# Patient Record
Sex: MALE | Race: WHITE | NOT HISPANIC OR LATINO | Employment: FULL TIME | URBAN - METROPOLITAN AREA
[De-identification: names, ages, dates, MRNs, and addresses within clinical notes are randomized per-mention and may not be internally consistent; named-entity substitution may affect disease eponyms.]

---

## 2022-07-25 ENCOUNTER — OFFICE VISIT (OUTPATIENT)
Dept: INTERNAL MEDICINE | Age: 47
End: 2022-07-25

## 2022-07-25 VITALS
OXYGEN SATURATION: 98 % | DIASTOLIC BLOOD PRESSURE: 80 MMHG | BODY MASS INDEX: 22.29 KG/M2 | HEART RATE: 90 BPM | TEMPERATURE: 97.8 F | HEIGHT: 76 IN | WEIGHT: 183 LBS | SYSTOLIC BLOOD PRESSURE: 122 MMHG

## 2022-07-25 DIAGNOSIS — F17.210 CIGARETTE NICOTINE DEPENDENCE WITHOUT COMPLICATION: ICD-10-CM

## 2022-07-25 DIAGNOSIS — Z76.89 ENCOUNTER TO ESTABLISH CARE: Primary | ICD-10-CM

## 2022-07-25 DIAGNOSIS — Z12.83 SCREENING EXAM FOR SKIN CANCER: ICD-10-CM

## 2022-07-25 DIAGNOSIS — Z11.59 ENCOUNTER FOR HEPATITIS C SCREENING TEST FOR LOW RISK PATIENT: ICD-10-CM

## 2022-07-25 DIAGNOSIS — I87.2 VENOUS INSUFFICIENCY OF RIGHT LOWER EXTREMITY: ICD-10-CM

## 2022-07-25 DIAGNOSIS — Z12.11 COLON CANCER SCREENING: ICD-10-CM

## 2022-07-25 PROCEDURE — 99214 OFFICE O/P EST MOD 30 MIN: CPT

## 2022-07-25 NOTE — PROGRESS NOTES
"    I N T E R N A L  M E D I C I N E  Neeru Fletcher, KARTHIK    ENCOUNTER DATE:  07/25/2022    Barrett Xiong / 46 y.o. / male      CHIEF COMPLAINT / REASON FOR OFFICE VISIT     Establish Care and Leg Problem (Right )      ASSESSMENT & PLAN     Diagnoses and all orders for this visit:    1. Encounter to establish care (Primary)  -     CBC & Differential; Future  -     Comprehensive Metabolic Panel; Future  -     Hemoglobin A1c; Future  -     Lipid Panel With / Chol / HDL Ratio; Future  -     TSH+Free T4; Future  -     Urinalysis without microscopic (no culture) - Urine, Clean Catch; Future    2. Venous insufficiency of right lower extremity  -     Ambulatory Referral to Vascular Surgery    3. Encounter for hepatitis C screening test for low risk patient  -     Hepatitis C Antibody; Future    4. Colon cancer screening  -     Ambulatory Referral For Screening Colonoscopy    5. Screening exam for skin cancer  -     Ambulatory Referral to Dermatology    6. Cigarette nicotine dependence without complication         SUMMARY/DISCUSSION  • Pt educated on signs of DVT for which he would need to seek immediate care at ED: swelling, pain, erythema, cramping pain in calf.    • Agreeable to update labs and annual physical in 1 week.  Will consider getting COVID vaccine and PNA vaccine at follow up.  • Not interested in quitting smoking at this moment, but is aware of health implications.  Agreeable to discussing further at follow up appointment.        Next Appointment with me: Visit date not found    Return in about 1 week (around 8/1/2022) for Annual physical + lab appointment for fasting labs.      VITAL SIGNS     Visit Vitals  /80   Pulse 90   Temp 97.8 °F (36.6 °C) (Temporal)   Ht 193 cm (76\")   Wt 83 kg (183 lb)   SpO2 98%   BMI 22.28 kg/m²           @BP@  Wt Readings from Last 3 Encounters:   07/25/22 83 kg (183 lb)   07/17/22 83.9 kg (185 lb)   09/04/21 85.7 kg (189 lb)     Body mass index is 22.28 " "kg/m².        MEDICATIONS AT THE TIME OF OFFICE VISIT     No current outpatient medications on file prior to visit.     No current facility-administered medications on file prior to visit.        HISTORY OF PRESENT ILLNESS     Here to establish care.  He complains of ongoing sensation of \"pressure and tightness\" in anterior R leg over the last 3 weeks.  He denies pain, numbness, tingling, erythema, swelling.  He works for "[employer] and is very physical active and drives a lot.  He notes his symptoms are made worse by lengthy driving, and tight shoes.  He was seen at an urgent care on July 17, and diagnosed with venous insufficiency.  He is a pack per day smoker.  Pt recently started wearing a compression sock on RLE and reports improvement in symptoms.      Patient Care Team:  Neeru Bynum APRN as PCP - General (Family Medicine)    REVIEW OF SYSTEMS     Review of Systems   Constitutional: Negative for chills, fatigue and fever.   Eyes: Negative for visual disturbance.   Respiratory: Negative for cough, chest tightness, shortness of breath and wheezing.    Cardiovascular: Negative for chest pain, palpitations and leg swelling.   Gastrointestinal: Negative for abdominal pain and blood in stool.   Musculoskeletal: Negative for back pain and gait problem.   Neurological: Negative for dizziness, weakness, light-headedness, numbness and headaches.          PHYSICAL EXAMINATION     Physical Exam  Vitals reviewed.   Constitutional:       General: He is not in acute distress.     Appearance: Normal appearance. He is not ill-appearing, toxic-appearing or diaphoretic.   HENT:      Head: Normocephalic and atraumatic.   Cardiovascular:      Rate and Rhythm: Normal rate and regular rhythm.      Pulses: Normal pulses.      Heart sounds: Normal heart sounds.   Pulmonary:      Effort: Pulmonary effort is normal.      Breath sounds: Normal breath sounds.   Musculoskeletal:         General: No swelling or deformity.   Skin:     " General: Skin is warm and dry.      Comments: varicose veins noted on anterior RLE, not hard and nontender   Neurological:      Mental Status: He is alert and oriented to person, place, and time. Mental status is at baseline.   Psychiatric:         Mood and Affect: Mood normal.         Behavior: Behavior normal.         Thought Content: Thought content normal.         Judgment: Judgment normal.           REVIEWED DATA     Labs:           Imaging:            Medical Tests:           Summary of old records / correspondence / consultant report:           Request outside records:

## 2022-07-27 ENCOUNTER — PATIENT ROUNDING (BHMG ONLY) (OUTPATIENT)
Dept: INTERNAL MEDICINE | Age: 47
End: 2022-07-27

## 2022-07-27 NOTE — PROGRESS NOTES
A My-Chart message has been sent to the patient for PATIENT ROUNDING with [facility]

## 2022-08-01 ENCOUNTER — OFFICE VISIT (OUTPATIENT)
Dept: INTERNAL MEDICINE | Age: 47
End: 2022-08-01

## 2022-08-01 VITALS
SYSTOLIC BLOOD PRESSURE: 130 MMHG | HEIGHT: 76 IN | HEART RATE: 81 BPM | OXYGEN SATURATION: 98 % | WEIGHT: 184 LBS | BODY MASS INDEX: 22.41 KG/M2 | TEMPERATURE: 96.9 F | DIASTOLIC BLOOD PRESSURE: 82 MMHG

## 2022-08-01 DIAGNOSIS — Z00.00 ANNUAL PHYSICAL EXAM: Primary | ICD-10-CM

## 2022-08-01 DIAGNOSIS — R97.20 ELEVATED PSA: ICD-10-CM

## 2022-08-01 DIAGNOSIS — F17.210 CIGARETTE NICOTINE DEPENDENCE WITHOUT COMPLICATION: ICD-10-CM

## 2022-08-01 DIAGNOSIS — Z12.5 PROSTATE CANCER SCREENING: ICD-10-CM

## 2022-08-01 DIAGNOSIS — F52.4 PREMATURE EJACULATION: ICD-10-CM

## 2022-08-01 PROCEDURE — 99396 PREV VISIT EST AGE 40-64: CPT

## 2022-08-01 NOTE — PROGRESS NOTES
"    I N T E R N A L  M E D I C I N E  KARTHIK Mccormick      ENCOUNTER DATE:  08/01/2022    Barrett Xiong / 46 y.o. / male    CHIEF COMPLAINT     Annual Exam    Is scheduled to follow up with Dermatology on September 12.    Has yet to schedule colonoscopy and with vascular for venous insufficiency of RLE, but does plan to schedule both.      VITALS     Visit Vitals  /82   Pulse 81   Temp 96.9 °F (36.1 °C) (Temporal)   Ht 193 cm (75.98\")   Wt 83.5 kg (184 lb)   SpO2 98%   BMI 22.41 kg/m²       BP Readings from Last 3 Encounters:   08/01/22 130/82   07/25/22 122/80   07/17/22 176/76     Wt Readings from Last 3 Encounters:   08/01/22 83.5 kg (184 lb)   07/25/22 83 kg (183 lb)   07/17/22 83.9 kg (185 lb)      Body mass index is 22.41 kg/m².      MEDICATIONS     No current outpatient medications on file prior to visit.     No current facility-administered medications on file prior to visit.         HISTORY OF PRESENT ILLNESS      Barrett presents for annual health maintenance visit.    · General health: good  · Lifestyle:  · Attempting to lose weight?: No   · Diet: does not pay attention to diet, does try to incorporate vegetables, small amounts of fruit  · Exercise: very active at work/home  · Tobacco: Regular use (~1 pack/day)   · Alcohol: occasional/infrequent  · Work: Full-time  · Reproductive health:  · Sexually active?: Yes   · Concern for STD?: No   · Sexual problems?: diminished libido and premature ejaculation   · Sees Urologist?: No   · Depression Screening:      PHQ-2/PHQ-9 Depression Screening 7/25/2022   Little Interest or Pleasure in Doing Things 0-->not at all   Feeling Down, Depressed or Hopeless 0-->not at all   PHQ-9: Brief Depression Severity Measure Score 0         PHQ-2: 0 (Not depressed)     PHQ-9: 0 (Negative screening for depression)    Patient Care Team:  Neeru Bynum APRN as PCP - General (Family " Medicine)  ______________________________________________________________________    ALLERGIES  No Known Allergies     PFSH:     The following portions of the patient's history were reviewed and updated as appropriate: Allergies / Current Medications / Past Medical History / Surgical History / Social History / Family History    PROBLEM LIST   Patient Active Problem List   Diagnosis   • Venous insufficiency of right lower extremity   • Cigarette nicotine dependence without complication       PAST MEDICAL HISTORY  No past medical history on file.    SURGICAL HISTORY  No past surgical history on file.    SOCIAL HISTORY  Social History     Socioeconomic History   • Marital status:    Tobacco Use   • Smoking status: Current Every Day Smoker     Packs/day: 1.00     Types: Cigarettes   • Smokeless tobacco: Never Used   Vaping Use   • Vaping Use: Never used   Substance and Sexual Activity   • Alcohol use: Yes     Comment: socially   • Drug use: Never   • Sexual activity: Yes     Partners: Female       FAMILY HISTORY  Family History   Problem Relation Age of Onset   • Cancer Mother         Bowel   • Cancer Father         lung & throat   • No Known Problems Sister        IMMUNIZATION HISTORY    There is no immunization history on file for this patient.      REVIEW OF SYSTEMS     Review of Systems   Constitutional: Negative for chills, fever and unexpected weight change.   Respiratory: Negative for cough, chest tightness and shortness of breath.    Cardiovascular: Negative for chest pain, palpitations and leg swelling.   Gastrointestinal: Negative for abdominal pain and blood in stool.   Genitourinary: Negative for difficulty urinating.   Neurological: Negative for dizziness, weakness, light-headedness and headaches.   Psychiatric/Behavioral: The patient is not nervous/anxious.        PHYSICAL EXAMINATION     Physical Exam  Vitals reviewed.   Constitutional:       General: He is not in acute distress.     Appearance:  Normal appearance. He is not ill-appearing, toxic-appearing or diaphoretic.   HENT:      Head: Normocephalic and atraumatic.      Right Ear: Tympanic membrane, ear canal and external ear normal. There is no impacted cerumen.      Left Ear: Tympanic membrane, ear canal and external ear normal. There is no impacted cerumen.      Nose: Nose normal. No congestion or rhinorrhea.      Mouth/Throat:      Mouth: Mucous membranes are moist.      Pharynx: Oropharynx is clear. No oropharyngeal exudate or posterior oropharyngeal erythema.   Eyes:      Extraocular Movements: Extraocular movements intact.      Conjunctiva/sclera: Conjunctivae normal.      Pupils: Pupils are equal, round, and reactive to light.   Cardiovascular:      Rate and Rhythm: Normal rate and regular rhythm.      Heart sounds: Normal heart sounds.   Pulmonary:      Effort: Pulmonary effort is normal. No respiratory distress.      Breath sounds: Normal breath sounds.   Abdominal:      General: Bowel sounds are normal.      Palpations: Abdomen is soft.      Tenderness: There is no abdominal tenderness.   Musculoskeletal:         General: Normal range of motion.      Cervical back: Normal range of motion and neck supple.      Right lower leg: No edema.      Left lower leg: No edema.   Lymphadenopathy:      Cervical: No cervical adenopathy.   Skin:     General: Skin is warm and dry.   Neurological:      General: No focal deficit present.      Mental Status: He is alert and oriented to person, place, and time. Mental status is at baseline.   Psychiatric:         Mood and Affect: Mood normal.         Behavior: Behavior normal.         Thought Content: Thought content normal.         Judgment: Judgment normal.         REVIEWED DATA      Labs:    No results found for: NA, K, CALCIUM, AST, ALT, BUN, CREATININE, EGFRIFNONA, EGFRIFAFRI    No results found for: GLUCOSE, HGBA1C, TSH, FREET4    No results found for: PSA    [unfilled]    No results found for: LDL, HDL, TRIG,  CHOLHDLRATIO    No components found for: RBFH010V    No results found for: WBC, HGB, MCV, PLT    No results found for: PROTEIN, GLUCOSEU, BLOODU, NITRITEU, LEUKOCYTESUR     No results found for: HEPCVIRUSABY    Imaging:           Medical Tests:           ASSESSMENT & PLAN     ANNUAL WELLNESS EXAM / PHYSICAL     Diagnoses and all orders for this visit:    1. Annual physical exam (Primary)    2. Premature ejaculation  -     Ambulatory Referral to Urology    3. Prostate cancer screening  -     PSA Screen    4. Cigarette nicotine dependence without complication         Summary/Discussion:     · Pt to follow up to schedule appointment with vascular surgery.  · He is agreeable to work towards decreasing daily cigarette use.  He is aware of health implications of continued smoking.    · Referral to urology for lifelong concern of premature ejaculation.    · He believes he received TDAP approximately 3 years ago.  He will verify records.    Next Appointment with me: Visit date not found    Return in about 6 months (around 2/1/2023) for Recheck, then yearly annual.      HEALTHCARE MAINTENANCE ISSUES       Cancer Screening:  · Colon: Initial/Next screening at age: DUE NOW  · Repeat colon cancer screening: N/A at this time  · Prostate: PSA ordered, defer MARY  · Testicular: Recommended monthly self exam  · Skin: Monthly self skin examination, annual exam by health professional  · Lung: Does not meet criteria for lung cancer screening.   · Other:    Screening Labs & Tests:  · Lab results reviewed & discussed with with patient or orders placed today.  · EKG:  · CV Screening: Lipid panel  · DEXA (75+ or risk factors):   · HEP C (If born [id number] or risk factors): Ordered  · Other:     Immunization/Vaccinations (to be given today unless deferred by patient)  · Influenza: Recommended annual influenza vaccine  · Hepatitis A: Verify immunization records  · Hepatitis B: Verify immunization records  · Tetanus/Pertussis: Verify  immunization records - believes this was done 3 years ago at [facility]  · Pneumovax/PCV: Declined by patient  · Shingles: Not needed at this time  · COVID: Advised to take the vaccine   Lifestyle Counseling:  · Lifestyle Modifications: Quit smoking  · Safety Issues: Always wear seatbelt, Avoid texting while driving   · Use sunscreen, regular skin examination  · Recommended annual dental/vision examination.  · Emotional/Stress/Sleep: Reviewed and  given when appropriate      Health Maintenance   Topic Date Due   • COLORECTAL CANCER SCREENING  Never done   • TDAP/TD VACCINES (1 - Tdap) Never done   • HEPATITIS C SCREENING  Never done   • COVID-19 Vaccine (1) 08/03/2022 (Originally 6/30/1976)   • Pneumococcal Vaccine 0-64 (1 - PCV) 08/01/2023 (Originally 12/31/1981)   • INFLUENZA VACCINE  10/01/2022   • ANNUAL PHYSICAL  08/02/2023

## 2022-08-02 PROBLEM — R97.20 PSA ELEVATION: Status: ACTIVE | Noted: 2022-08-02

## 2022-08-02 LAB — PSA SERPL-MCNC: 5 NG/ML (ref 0–4)

## 2022-08-18 ENCOUNTER — PRE-PROCEDURE SCREENING (OUTPATIENT)
Dept: GASTROENTEROLOGY | Facility: CLINIC | Age: 47
End: 2022-08-18

## 2022-08-30 ENCOUNTER — HOSPITAL ENCOUNTER (OUTPATIENT)
Dept: CARDIOLOGY | Facility: HOSPITAL | Age: 47
Discharge: HOME | End: 2022-08-30

## 2022-08-30 ENCOUNTER — OFFICE VISIT (OUTPATIENT)
Dept: INTERNAL MEDICINE | Age: 47
End: 2022-08-30

## 2022-08-30 VITALS
TEMPERATURE: 96.4 F | HEART RATE: 87 BPM | BODY MASS INDEX: 22.29 KG/M2 | SYSTOLIC BLOOD PRESSURE: 120 MMHG | WEIGHT: 183 LBS | OXYGEN SATURATION: 99 % | DIASTOLIC BLOOD PRESSURE: 80 MMHG | HEIGHT: 76 IN

## 2022-08-30 DIAGNOSIS — M79.604 RIGHT LEG PAIN: Primary | ICD-10-CM

## 2022-08-30 DIAGNOSIS — M79.671 RIGHT FOOT PAIN: ICD-10-CM

## 2022-08-30 LAB
BH CV LOWER VASCULAR LEFT COMMON FEMORAL AUGMENT: NORMAL
BH CV LOWER VASCULAR LEFT COMMON FEMORAL COMPETENT: NORMAL
BH CV LOWER VASCULAR LEFT COMMON FEMORAL COMPRESS: NORMAL
BH CV LOWER VASCULAR LEFT COMMON FEMORAL PHASIC: NORMAL
BH CV LOWER VASCULAR LEFT COMMON FEMORAL SPONT: NORMAL
BH CV LOWER VASCULAR RIGHT COMMON FEMORAL AUGMENT: NORMAL
BH CV LOWER VASCULAR RIGHT COMMON FEMORAL COMPETENT: NORMAL
BH CV LOWER VASCULAR RIGHT COMMON FEMORAL COMPRESS: NORMAL
BH CV LOWER VASCULAR RIGHT COMMON FEMORAL PHASIC: NORMAL
BH CV LOWER VASCULAR RIGHT COMMON FEMORAL SPONT: NORMAL
BH CV LOWER VASCULAR RIGHT DISTAL FEMORAL COMPRESS: NORMAL
BH CV LOWER VASCULAR RIGHT GASTRONEMIUS COMPRESS: NORMAL
BH CV LOWER VASCULAR RIGHT GREATER SAPH AK COMPRESS: NORMAL
BH CV LOWER VASCULAR RIGHT GREATER SAPH BK COMPRESS: NORMAL
BH CV LOWER VASCULAR RIGHT LESSER SAPH COMPRESS: NORMAL
BH CV LOWER VASCULAR RIGHT MID FEMORAL AUGMENT: NORMAL
BH CV LOWER VASCULAR RIGHT MID FEMORAL COMPETENT: NORMAL
BH CV LOWER VASCULAR RIGHT MID FEMORAL COMPRESS: NORMAL
BH CV LOWER VASCULAR RIGHT MID FEMORAL PHASIC: NORMAL
BH CV LOWER VASCULAR RIGHT MID FEMORAL SPONT: NORMAL
BH CV LOWER VASCULAR RIGHT PERONEAL COMPRESS: NORMAL
BH CV LOWER VASCULAR RIGHT POPLITEAL AUGMENT: NORMAL
BH CV LOWER VASCULAR RIGHT POPLITEAL COMPETENT: NORMAL
BH CV LOWER VASCULAR RIGHT POPLITEAL COMPRESS: NORMAL
BH CV LOWER VASCULAR RIGHT POPLITEAL PHASIC: NORMAL
BH CV LOWER VASCULAR RIGHT POPLITEAL SPONT: NORMAL
BH CV LOWER VASCULAR RIGHT POSTERIOR TIBIAL COMPRESS: NORMAL
BH CV LOWER VASCULAR RIGHT PROFUNDA FEMORAL COMPRESS: NORMAL
BH CV LOWER VASCULAR RIGHT PROXIMAL FEMORAL COMPRESS: NORMAL
BH CV LOWER VASCULAR RIGHT SAPHENOFEMORAL JUNCTION COMPRESS: NORMAL
BH CV LOWER VASCULAR RIGHT VARICOSITY BK COMPRESS: NORMAL
MAXIMAL PREDICTED HEART RATE: 175 BPM
STRESS TARGET HR: 149 BPM

## 2022-08-30 PROCEDURE — 93971 EXTREMITY STUDY: CPT

## 2022-08-30 PROCEDURE — 99214 OFFICE O/P EST MOD 30 MIN: CPT

## 2022-08-30 NOTE — PROGRESS NOTES
"    I N T E R N A L  M E D I C I N E  Neeru Bynum, APRN    ENCOUNTER DATE:  08/30/2022    Barrett Xiong / 46 y.o. / male      CHIEF COMPLAINT / REASON FOR OFFICE VISIT     Leg Pain      ASSESSMENT & PLAN     Diagnoses and all orders for this visit:    1. Right leg pain (Primary)  -     Duplex Venous Lower Extremity - Right CAR  -     XR Foot 3+ View Right    2. Right foot pain  -     Ambulatory Referral to Podiatry         SUMMARY/DISCUSSION  • Will rule out DVT due to pt's history of venous insufficiency, smoking, and reports of increased RLE pain and tightness upon waking this morning.  • Pt educated on importance of smoking cessation.  • Follow up with vascular as scheduled.  • Suspect possible Valentine's neuroma.  Foot XR and referral to podiatry placed.   • Pt aware of importance of following up with urology for history of elevated PSA.  He states he plans on scheduling.      Next Appointment with me: 2/1/2023    Return for Next scheduled follow up.      VITAL SIGNS     Visit Vitals  /80   Pulse 87   Temp 96.4 °F (35.8 °C) (Temporal)   Ht 193 cm (75.98\")   Wt 83 kg (183 lb)   SpO2 99%   BMI 22.28 kg/m²             Wt Readings from Last 3 Encounters:   08/30/22 83 kg (183 lb)   08/01/22 83.5 kg (184 lb)   07/25/22 83 kg (183 lb)     Body mass index is 22.28 kg/m².        MEDICATIONS AT THE TIME OF OFFICE VISIT     No current outpatient medications on file prior to visit.     No current facility-administered medications on file prior to visit.        HISTORY OF PRESENT ILLNESS     Reports he has appointment with vascular approximately 2 weeks ago.  They scheduled US to evaluate venous insufficiency, but it is not scheduled until January.  He has increased his compression sock from 10 mmHg to 20 mmHg of pressure with some benefit.    This morning he woke up with RLE pain and sensation of tightness in R leg and foot.  Denies any swelling, erythema.  Does report pain with walking on R ball of foot, sometimes " feels as though he is stepping on a pebble.  He is very physically active for work.  Does report increased physical activity over the last couple weeks.      Patient Care Team:  Neeru Bynum APRN as PCP - General (Family Medicine)    REVIEW OF SYSTEMS     Review of Systems   Constitutional: Negative for chills, fever and unexpected weight change.   Respiratory: Negative for cough, chest tightness and shortness of breath.    Cardiovascular: Negative for chest pain, palpitations and leg swelling.   Neurological: Negative for dizziness, weakness, light-headedness and headaches.   Psychiatric/Behavioral: The patient is not nervous/anxious.           PHYSICAL EXAMINATION     Physical Exam  Vitals reviewed.   Constitutional:       General: He is not in acute distress.     Appearance: Normal appearance. He is not ill-appearing, toxic-appearing or diaphoretic.   HENT:      Head: Normocephalic and atraumatic.   Cardiovascular:      Rate and Rhythm: Normal rate and regular rhythm.      Pulses: Normal pulses.      Heart sounds: Normal heart sounds.   Pulmonary:      Effort: Pulmonary effort is normal.      Breath sounds: Normal breath sounds.   Musculoskeletal:         General: No swelling.      Right lower leg: No edema.      Left lower leg: No edema.   Skin:     General: Skin is warm and dry.      Coloration: Skin is pale (R foot).      Findings: No erythema.      Comments: Cool R foot   Neurological:      Mental Status: He is alert and oriented to person, place, and time. Mental status is at baseline.   Psychiatric:         Mood and Affect: Mood normal.         Behavior: Behavior normal.         Thought Content: Thought content normal.         Judgment: Judgment normal.           REVIEWED DATA     Labs:           Imaging:            Medical Tests:           Summary of old records / correspondence / consultant report:           Request outside records:

## 2022-09-12 ENCOUNTER — TELEPHONE (OUTPATIENT)
Dept: GASTROENTEROLOGY | Facility: CLINIC | Age: 47
End: 2022-09-12

## 2022-09-12 NOTE — TELEPHONE ENCOUNTER
Caller: Barrett Xiong    Relationship to patient: Self    Best call back number: [phone]     Type of visit: COLONOSCOPY.    Requested date: NEXT AVAILABLE     Additional notes:PATIENT CALLED IN ASKING TO SCHEDULE COLONOSCOPY.

## 2022-12-27 ENCOUNTER — TELEPHONE (OUTPATIENT)
Dept: INTERNAL MEDICINE | Age: 47
End: 2022-12-27

## 2022-12-27 NOTE — TELEPHONE ENCOUNTER
Caller: Barrett Xiong    Relationship: Self    Best call back number: [phone]    What orders are you requesting (i.e. lab or imaging): COVID TEST    Additional notes:PATIENTS WORK IS REQUIRING A PCR COVID TEST TO BE SUBMITTED. HE TOOK AT AT AT HOME COVID TEST 12/27    HAS HAD BODY ACHES , CONGESTION,  MILD FEVER SINCE 12/25

## 2023-02-06 ENCOUNTER — OFFICE VISIT (OUTPATIENT)
Dept: INTERNAL MEDICINE | Age: 48
End: 2023-02-06
Payer: COMMERCIAL

## 2023-02-06 VITALS
BODY MASS INDEX: 22.41 KG/M2 | DIASTOLIC BLOOD PRESSURE: 80 MMHG | TEMPERATURE: 98.2 F | HEART RATE: 91 BPM | WEIGHT: 184 LBS | SYSTOLIC BLOOD PRESSURE: 134 MMHG | HEIGHT: 76 IN | OXYGEN SATURATION: 99 %

## 2023-02-06 DIAGNOSIS — F17.210 CIGARETTE NICOTINE DEPENDENCE WITHOUT COMPLICATION: ICD-10-CM

## 2023-02-06 DIAGNOSIS — R97.20 ELEVATED PSA: Primary | ICD-10-CM

## 2023-02-06 DIAGNOSIS — I87.2 VENOUS INSUFFICIENCY OF RIGHT LOWER EXTREMITY: ICD-10-CM

## 2023-02-06 PROCEDURE — 99213 OFFICE O/P EST LOW 20 MIN: CPT

## 2023-02-06 NOTE — PROGRESS NOTES
"    I N T E R N A L  M E D I C I N E  Neeru Bynum, APRN    ENCOUNTER DATE:  02/06/2023    Barrett Xiong / 47 y.o. / male      CHIEF COMPLAINT / REASON FOR OFFICE VISIT     Elevated PSA      ASSESSMENT & PLAN     Diagnoses and all orders for this visit:    1. Elevated PSA (Primary)  -     PSA DIAGNOSTIC    2. Venous insufficiency of right lower extremity    3. Cigarette nicotine dependence without complication         SUMMARY/DISCUSSION  • Agreeable to recheck PSA.  Discussed importance of reaching out to urology office to schedule appointment.  • Continue to be followed by vascular surgery.  • Declined smoking cessation at this time.      Next Appointment with me: 8/3/2023    Return for Next scheduled follow up.      VITAL SIGNS     Visit Vitals  /80   Pulse 91   Temp 98.2 °F (36.8 °C)   Ht 193 cm (75.98\")   Wt 83.5 kg (184 lb)   SpO2 99%   BMI 22.41 kg/m²             Wt Readings from Last 3 Encounters:   02/06/23 83.5 kg (184 lb)   08/30/22 83 kg (183 lb)   08/01/22 83.5 kg (184 lb)     Body mass index is 22.41 kg/m².        MEDICATIONS AT THE TIME OF OFFICE VISIT     No current outpatient medications on file prior to visit.     No current facility-administered medications on file prior to visit.        HISTORY OF PRESENT ILLNESS     Venous insufficiency of RLE: He is followed by Dr. Dominguez, vascular surgery.  He was recommended to have a laser procedure to resolve vein reflux.  He is in the process of obtaining insurance approval for procedure.  In the last couple of months, his symptoms have drastically improved.  He denies any pain, swelling, redness, warmth.  Denies any difficulties with driving.      Elevated PSA: He was referred to urology for elevated PSA of 5.0 in August 2022, as well as lifelong difficulties with premature ejactulation.  He reports he has called the urology office multiple times without a return phone call.  He remains asymptomatic.  Denies any urinary frequency, urgency, changes in " stream, groin pain.    He has not yet scheduled colonoscopy.    He was working towards smoking cessation, but recent life stressors have made that increasingly difficult.  His wife recently found out that she is pregnant, after years of being infertile.        Patient Care Team:  Neeru Bynum APRN as PCP - General (Family Medicine)  Jeffery Dominguez Jr., MD as Consulting Physician (Vascular Surgery)    REVIEW OF SYSTEMS     Review of Systems   Constitutional: Negative for chills, fever and unexpected weight change.   Respiratory: Negative for cough, chest tightness and shortness of breath.    Cardiovascular: Negative for chest pain, palpitations and leg swelling.   Neurological: Negative for dizziness, weakness, light-headedness and headaches.   Psychiatric/Behavioral: The patient is not nervous/anxious.           PHYSICAL EXAMINATION     Physical Exam  Vitals reviewed.   Constitutional:       General: He is not in acute distress.     Appearance: Normal appearance. He is not ill-appearing, toxic-appearing or diaphoretic.   HENT:      Head: Normocephalic and atraumatic.   Cardiovascular:      Rate and Rhythm: Normal rate and regular rhythm.      Pulses: Normal pulses.      Heart sounds: Normal heart sounds.   Pulmonary:      Effort: Pulmonary effort is normal.      Breath sounds: Normal breath sounds.   Musculoskeletal:      Right lower leg: No edema.      Left lower leg: No edema.   Skin:     General: Skin is warm and dry.   Neurological:      Mental Status: He is alert and oriented to person, place, and time. Mental status is at baseline.   Psychiatric:         Mood and Affect: Mood normal.         Behavior: Behavior normal.         Thought Content: Thought content normal.         Judgment: Judgment normal.           REVIEWED DATA     Labs:           Imaging:            Medical Tests:           Summary of old records / correspondence / consultant report:           Request outside records:

## 2023-03-14 ENCOUNTER — TELEPHONE (OUTPATIENT)
Dept: INTERNAL MEDICINE | Age: 48
End: 2023-03-14
Payer: COMMERCIAL

## 2023-03-29 ENCOUNTER — TELEPHONE (OUTPATIENT)
Dept: INTERNAL MEDICINE | Age: 48
End: 2023-03-29
Payer: COMMERCIAL

## 2023-03-29 NOTE — TELEPHONE ENCOUNTER
CALL 1;   LDTVM ADVISING TO MAKE APPT FOR LAB DRAW OR LET STAFF KNOW NOT GOING TO BE DONE SO APPT CAN BE CANCELED.

## 2023-06-12 ENCOUNTER — TELEPHONE (OUTPATIENT)
Dept: INTERNAL MEDICINE | Age: 48
End: 2023-06-12
Payer: COMMERCIAL

## 2023-06-12 NOTE — TELEPHONE ENCOUNTER
"Spoke c pt. He will call to [facility] appt at later time.     ----- Message from KARTHIK Mccormick sent at 6/12/2023  9:43 AM EDT -----  Regarding: FW: Cancellation of Order # [id number]  Please call pt.  It is VERY important that he recheck his PSA with our office, as well as follow up with urology appointment.  With a history of elevated PSA, there is concern for possible prostate cancer.    ----- Message -----  From: Bella El MA  Sent: 6/12/2023   9:24 AM EDT  To: KARTHIK Mccormick  Subject: Cancellation of Order # [id number]                Order number [id number] for the procedure PSA DIAGNOSTIC [[id number]]   has been canceled by Bella El MA [[id number]]. This procedure   was ordered by you on Feb 6, 2023 for the patient Barrett Xiong   [[id number]]. The reason for cancellation was \"Other\".    "

## 2024-03-18 ENCOUNTER — AMBULATORY SURGICAL CENTER (OUTPATIENT)
Dept: URBAN - METROPOLITAN AREA SURGERY 20 | Facility: SURGERY | Age: 49
End: 2024-03-18
Payer: COMMERCIAL

## 2024-03-18 ENCOUNTER — OFFICE (OUTPATIENT)
Dept: URBAN - METROPOLITAN AREA PATHOLOGY 4 | Facility: PATHOLOGY | Age: 49
End: 2024-03-18

## 2024-03-18 DIAGNOSIS — D12.0 BENIGN NEOPLASM OF CECUM: ICD-10-CM

## 2024-03-18 DIAGNOSIS — D12.2 BENIGN NEOPLASM OF ASCENDING COLON: ICD-10-CM

## 2024-03-18 DIAGNOSIS — K57.30 DIVERTICULOSIS OF LARGE INTESTINE WITHOUT PERFORATION OR ABS: ICD-10-CM

## 2024-03-18 DIAGNOSIS — K62.1 RECTAL POLYP: ICD-10-CM

## 2024-03-18 DIAGNOSIS — K64.0 FIRST DEGREE HEMORRHOIDS: ICD-10-CM

## 2024-03-18 DIAGNOSIS — Z12.11 ENCOUNTER FOR SCREENING FOR MALIGNANT NEOPLASM OF COLON: ICD-10-CM

## 2024-03-18 PROBLEM — K63.5 POLYP OF COLON: Status: ACTIVE | Noted: 2024-03-18

## 2024-03-18 LAB
GI HISTOLOGY: A. RECTAL POLYP: (no result)
GI HISTOLOGY: C. ASCENDING POLYP: (no result)
GI HISTOLOGY: PDF REPORT: (no result)
Lab: (no result)

## 2024-03-18 PROCEDURE — 88305 TISSUE EXAM BY PATHOLOGIST: CPT | Performed by: PATHOLOGY

## 2024-03-18 PROCEDURE — 45380 COLONOSCOPY AND BIOPSY: CPT | Mod: 33 | Performed by: INTERNAL MEDICINE
